# Patient Record
Sex: FEMALE | URBAN - METROPOLITAN AREA
[De-identification: names, ages, dates, MRNs, and addresses within clinical notes are randomized per-mention and may not be internally consistent; named-entity substitution may affect disease eponyms.]

---

## 2021-07-13 ENCOUNTER — OFFICE VISIT (OUTPATIENT)
Dept: URBAN - METROPOLITAN AREA TELEHEALTH 2 | Facility: TELEHEALTH | Age: 51
End: 2021-07-13
Payer: COMMERCIAL

## 2021-07-13 ENCOUNTER — LAB OUTSIDE AN ENCOUNTER (OUTPATIENT)
Dept: URBAN - METROPOLITAN AREA TELEHEALTH 2 | Facility: TELEHEALTH | Age: 51
End: 2021-07-13

## 2021-07-13 ENCOUNTER — TELEPHONE ENCOUNTER (OUTPATIENT)
Dept: URBAN - METROPOLITAN AREA CLINIC 92 | Facility: CLINIC | Age: 51
End: 2021-07-13

## 2021-07-13 DIAGNOSIS — K44.9 DIAPHRAGMATIC HERNIA WITHOUT OBSTRUCTION OR GANGRENE: ICD-10-CM

## 2021-07-13 DIAGNOSIS — K21.9 GASTROESOPHAGEAL REFLUX DISEASE WITHOUT ESOPHAGITIS: ICD-10-CM

## 2021-07-13 DIAGNOSIS — J98.2 ACQUIRED PNEUMOMEDIASTINUM: ICD-10-CM

## 2021-07-13 DIAGNOSIS — R11.2 NON-INTRACTABLE VOMITING WITH NAUSEA, UNSPECIFIED VOMITING TYPE: ICD-10-CM

## 2021-07-13 PROBLEM — 371132002: Status: ACTIVE | Noted: 2021-07-13

## 2021-07-13 PROCEDURE — 99203 OFFICE O/P NEW LOW 30 MIN: CPT | Performed by: INTERNAL MEDICINE

## 2021-07-13 RX ORDER — SUCRALFATE 1 G
1 TABLET PRIOR TO MEALS TABLET ORAL THREE TIMES A DAY
Qty: 90 TABLET | Refills: 1 | OUTPATIENT
Start: 2021-07-13 | End: 2021-09-11

## 2021-07-13 RX ORDER — BUSPIRONE HYDROCHLORIDE 10 MG/1
TABLET ORAL
Qty: 0 | Refills: 0 | Status: ACTIVE | COMMUNITY
Start: 1900-01-01

## 2021-07-13 RX ORDER — NALTREXONE HYDROCHLORIDE AND BUPROPION HYDROCHLORIDE 8; 90 MG/1; MG/1
2 TABLETS TABLET, EXTENDED RELEASE ORAL TWICE A DAY
Status: ACTIVE | COMMUNITY

## 2021-07-13 RX ORDER — BIOTIN 10 MG
1 TABLET TABLET ORAL ONCE A DAY
Status: ACTIVE | COMMUNITY

## 2021-07-13 RX ORDER — MULTIVIT-MIN/IRON/FOLIC ACID/K 18-600-40
AS DIRECTED CAPSULE ORAL
Status: ACTIVE | COMMUNITY

## 2021-07-13 RX ORDER — PANTOPRAZOLE SODIUM 40 MG/1
TAKE 1 TABLET BY MOUTH ONCE DAILY TABLET, DELAYED RELEASE ORAL
Qty: 90 | Refills: 0 | Status: ACTIVE | COMMUNITY
Start: 2018-06-29

## 2021-07-13 RX ORDER — OMEPRAZOLE 40 MG/1
1 CAPSULE 30 MINUTES BEFORE MORNING MEAL CAPSULE, DELAYED RELEASE ORAL ONCE A DAY
Status: ACTIVE | COMMUNITY

## 2021-07-13 RX ORDER — THIAMINE HCL 100 MG
AS DIRECTED TABLET ORAL
Status: ACTIVE | COMMUNITY

## 2021-07-13 RX ORDER — PANTOPRAZOLE SODIUM 20 MG/1
1 TABLET TABLET, DELAYED RELEASE ORAL TWICE A DAY
Qty: 180 TABLET | Refills: 0 | OUTPATIENT
Start: 2021-07-13

## 2021-07-13 NOTE — HPI-TODAY'S VISIT:
Ms. Mac Collins is a 50-year-old female was seen today over telehealth with complaints of having significant abdominal discomfort and pain in abnormal CT scan findings.  Patient was seen last in our office in 2018.  He reports history of having abdominoplasty as well as umbilical hernia repair in September 2020 at [facility] by Dr. Sharma.  Patient reports of postop complications of infection that required IV antibiotics and then oral clindamycin for extended period of time.  Patient stated ever since she had a surgery she has had abdominal bloating discomfort she still waits and wears the compression abdominal binder because of her swelling in her abdomen.  Patient stated she relocated to [place] for bed.  Around that time she said she continued to have some chest discomfort and pain and epigastric pain her surgeon have ordered a CT scan of the abdomen with CT scan was done at the [place] that showed a small pocket of pneumomediastinum are otherwise stress postsurgical changes were noted.  No evidence of cellulitis was noted.  Patient denies any prior renal prior history of having violent episode of throwing up after that led to the hospital or emergency room visit.  However she reports having significant heartburn on a daily basis and she vomits at least once a week.  Patient reports vomiting ingested material.  Patient denies any fever chills or rigors.  She has had an upper endoscopy in 2018 that showed 2 cm hiatal hernia.  She denies any chest pain at this time.  She reports her intermittent constipation and has been worse after the surgery.  Denies any fever chills or rigors denies melanotic stools.

## 2021-08-20 ENCOUNTER — TELEPHONE ENCOUNTER (OUTPATIENT)
Dept: URBAN - METROPOLITAN AREA CLINIC 115 | Facility: CLINIC | Age: 51
End: 2021-08-20

## 2021-08-24 PROBLEM — 16838000: Status: ACTIVE | Noted: 2021-07-13

## 2021-08-24 PROBLEM — 266435005: Status: ACTIVE | Noted: 2021-07-13

## 2021-08-31 ENCOUNTER — TELEPHONE ENCOUNTER (OUTPATIENT)
Dept: URBAN - METROPOLITAN AREA CLINIC 82 | Facility: CLINIC | Age: 51
End: 2021-08-31

## 2021-09-23 ENCOUNTER — TELEPHONE ENCOUNTER (OUTPATIENT)
Dept: URBAN - METROPOLITAN AREA CLINIC 92 | Facility: CLINIC | Age: 51
End: 2021-09-23

## 2021-09-23 ENCOUNTER — CLAIMS CREATED FROM THE CLAIM WINDOW (OUTPATIENT)
Dept: URBAN - METROPOLITAN AREA CLINIC 4 | Facility: CLINIC | Age: 51
End: 2021-09-23
Payer: COMMERCIAL

## 2021-09-23 ENCOUNTER — OFFICE VISIT (OUTPATIENT)
Dept: URBAN - METROPOLITAN AREA SURGERY CENTER 13 | Facility: SURGERY CENTER | Age: 51
End: 2021-09-23
Payer: COMMERCIAL

## 2021-09-23 DIAGNOSIS — R13.19 CERVICAL DYSPHAGIA: ICD-10-CM

## 2021-09-23 DIAGNOSIS — K21.9 ACID REFLUX: ICD-10-CM

## 2021-09-23 DIAGNOSIS — R11.2 ACUTE NAUSEA WITH NONBILIOUS VOMITING: ICD-10-CM

## 2021-09-23 DIAGNOSIS — K31.89 NONSURGICAL DUMPING SYNDROME: ICD-10-CM

## 2021-09-23 DIAGNOSIS — K31.89 ACQUIRED DEFORMITY OF DUODENUM: ICD-10-CM

## 2021-09-23 DIAGNOSIS — K21.9 GASTRO-ESOPHAGEAL REFLUX DISEASE WITHOUT ESOPHAGITIS: ICD-10-CM

## 2021-09-23 DIAGNOSIS — K22.2 ACQUIRED ESOPHAGEAL RING: ICD-10-CM

## 2021-09-23 PROCEDURE — 88305 TISSUE EXAM BY PATHOLOGIST: CPT | Performed by: PATHOLOGY

## 2021-09-23 PROCEDURE — 88312 SPECIAL STAINS GROUP 1: CPT | Performed by: PATHOLOGY

## 2021-09-23 PROCEDURE — G8907 PT DOC NO EVENTS ON DISCHARG: HCPCS | Performed by: INTERNAL MEDICINE

## 2021-09-23 PROCEDURE — 43239 EGD BIOPSY SINGLE/MULTIPLE: CPT | Performed by: INTERNAL MEDICINE

## 2021-09-23 PROCEDURE — 43249 ESOPH EGD DILATION <30 MM: CPT | Performed by: INTERNAL MEDICINE

## 2021-09-23 RX ORDER — THIAMINE HCL 100 MG
AS DIRECTED TABLET ORAL
Status: ACTIVE | COMMUNITY

## 2021-09-23 RX ORDER — BUSPIRONE HYDROCHLORIDE 10 MG/1
TABLET ORAL
Qty: 0 | Refills: 0 | Status: ACTIVE | COMMUNITY
Start: 1900-01-01

## 2021-09-23 RX ORDER — RIFAXIMIN 550 MG/1
1 TABLET TABLET ORAL THREE TIMES A DAY
Qty: 42 TABLET | Refills: 0 | OUTPATIENT
Start: 2021-09-23 | End: 2021-10-07

## 2021-09-23 RX ORDER — NALTREXONE HYDROCHLORIDE AND BUPROPION HYDROCHLORIDE 8; 90 MG/1; MG/1
2 TABLETS TABLET, EXTENDED RELEASE ORAL TWICE A DAY
Status: ACTIVE | COMMUNITY

## 2021-09-23 RX ORDER — MULTIVIT-MIN/IRON/FOLIC ACID/K 18-600-40
AS DIRECTED CAPSULE ORAL
Status: ACTIVE | COMMUNITY

## 2021-09-23 RX ORDER — BIOTIN 10 MG
1 TABLET TABLET ORAL ONCE A DAY
Status: ACTIVE | COMMUNITY

## 2021-09-23 RX ORDER — OMEPRAZOLE 40 MG/1
1 CAPSULE 30 MINUTES BEFORE MORNING MEAL CAPSULE, DELAYED RELEASE ORAL ONCE A DAY
Status: ACTIVE | COMMUNITY

## 2021-09-23 RX ORDER — RIFAXIMIN 550 MG/1
1 TABLET TABLET ORAL THREE TIMES A DAY
Qty: 42 TABLET | Refills: 0 | Status: ACTIVE | COMMUNITY
Start: 2021-09-23 | End: 2021-10-07

## 2021-09-23 RX ORDER — PANTOPRAZOLE SODIUM 40 MG/1
TAKE 1 TABLET BY MOUTH ONCE DAILY TABLET, DELAYED RELEASE ORAL
Qty: 90 TABLET | Refills: 1
Start: 2018-06-29

## 2021-09-23 RX ORDER — PANTOPRAZOLE SODIUM 40 MG/1
TAKE 1 TABLET BY MOUTH ONCE DAILY TABLET, DELAYED RELEASE ORAL
Qty: 90 | Refills: 0 | Status: ACTIVE | COMMUNITY
Start: 2018-06-29

## 2021-09-23 RX ORDER — PANTOPRAZOLE SODIUM 20 MG/1
1 TABLET TABLET, DELAYED RELEASE ORAL TWICE A DAY
Qty: 180 TABLET | Refills: 0 | Status: ACTIVE | COMMUNITY
Start: 2021-07-13

## 2021-09-23 RX ORDER — PANTOPRAZOLE SODIUM 40 MG/1
TAKE 1 TABLET BY MOUTH ONCE DAILY TABLET, DELAYED RELEASE ORAL
Qty: 90 TABLET | Refills: 1 | Status: ACTIVE | COMMUNITY
Start: 2018-06-29

## 2021-09-24 ENCOUNTER — TELEPHONE ENCOUNTER (OUTPATIENT)
Dept: URBAN - METROPOLITAN AREA TELEHEALTH 2 | Facility: TELEHEALTH | Age: 51
End: 2021-09-24

## 2021-09-26 ENCOUNTER — WEB ENCOUNTER (OUTPATIENT)
Dept: URBAN - METROPOLITAN AREA CLINIC 82 | Facility: CLINIC | Age: 51
End: 2021-09-26

## 2021-09-26 RX ORDER — AMOXICILLIN AND CLAVULANATE POTASSIUM 500; 125 MG/1; 1/1
1 TABLET TABLET, FILM COATED ORAL
Qty: 30 TABLET | Refills: 0 | OUTPATIENT
Start: 2021-09-27 | End: 2021-10-07

## 2021-10-05 ENCOUNTER — WEB ENCOUNTER (OUTPATIENT)
Dept: URBAN - METROPOLITAN AREA CLINIC 82 | Facility: CLINIC | Age: 51
End: 2021-10-05

## 2021-10-11 ENCOUNTER — WEB ENCOUNTER (OUTPATIENT)
Dept: URBAN - METROPOLITAN AREA CLINIC 82 | Facility: CLINIC | Age: 51
End: 2021-10-11

## 2021-12-29 ENCOUNTER — ERX REFILL RESPONSE (OUTPATIENT)
Dept: URBAN - METROPOLITAN AREA CLINIC 82 | Facility: CLINIC | Age: 51
End: 2021-12-29

## 2021-12-29 RX ORDER — PANTOPRAZOLE SODIUM 20 MG/1
1 TABLET TABLET, DELAYED RELEASE ORAL TWICE A DAY
Qty: 180 TABLET | Refills: 0 | OUTPATIENT

## 2021-12-29 RX ORDER — PANTOPRAZOLE 20 MG/1
TAKE 1 TABLET BY MOUTH TWICE DAILY TABLET, DELAYED RELEASE ORAL
Qty: 180 TABLET | Refills: 1 | OUTPATIENT

## 2022-04-13 ENCOUNTER — TELEPHONE ENCOUNTER (OUTPATIENT)
Dept: URBAN - METROPOLITAN AREA CLINIC 115 | Facility: CLINIC | Age: 52
End: 2022-04-13

## 2022-04-13 RX ORDER — RIFAXIMIN 550 MG/1
1 TABLET TABLET ORAL THREE TIMES A DAY
Qty: 42 TABLET | Refills: 0 | OUTPATIENT
Start: 2022-04-13 | End: 2022-04-27

## 2022-12-27 ENCOUNTER — TELEPHONE ENCOUNTER (OUTPATIENT)
Dept: URBAN - METROPOLITAN AREA CLINIC 115 | Facility: CLINIC | Age: 52
End: 2022-12-27

## 2022-12-27 RX ORDER — RIFAXIMIN 550 MG/1
1 TABLET TABLET ORAL THREE TIMES A DAY
Qty: 42 TABLET | Refills: 0 | OUTPATIENT
Start: 2022-12-28 | End: 2023-01-11

## 2023-02-09 ENCOUNTER — OFFICE VISIT (OUTPATIENT)
Dept: URBAN - METROPOLITAN AREA CLINIC 82 | Facility: CLINIC | Age: 53
End: 2023-02-09

## 2023-05-10 ENCOUNTER — OFFICE VISIT (OUTPATIENT)
Dept: URBAN - METROPOLITAN AREA CLINIC 115 | Facility: CLINIC | Age: 53
End: 2023-05-10
Payer: COMMERCIAL

## 2023-05-10 ENCOUNTER — LAB OUTSIDE AN ENCOUNTER (OUTPATIENT)
Dept: URBAN - METROPOLITAN AREA CLINIC 115 | Facility: CLINIC | Age: 53
End: 2023-05-10

## 2023-05-10 VITALS
BODY MASS INDEX: 28.93 KG/M2 | TEMPERATURE: 97.2 F | SYSTOLIC BLOOD PRESSURE: 114 MMHG | HEIGHT: 66 IN | HEART RATE: 57 BPM | WEIGHT: 180 LBS | DIASTOLIC BLOOD PRESSURE: 72 MMHG

## 2023-05-10 DIAGNOSIS — K21.9 GASTRO-ESOPHAGEAL REFLUX DISEASE WITHOUT ESOPHAGITIS: ICD-10-CM

## 2023-05-10 DIAGNOSIS — K44.9 DIAPHRAGMATIC HERNIA WITHOUT OBSTRUCTION OR GANGRENE: ICD-10-CM

## 2023-05-10 DIAGNOSIS — K58.0 IRRITABLE BOWEL SYNDROME WITH DIARRHEA: ICD-10-CM

## 2023-05-10 DIAGNOSIS — R10.33 PERIUMBILICAL ABDOMINAL PAIN: ICD-10-CM

## 2023-05-10 PROBLEM — 443503005: Status: ACTIVE | Noted: 2023-05-10

## 2023-05-10 PROBLEM — 197125005: Status: ACTIVE | Noted: 2023-05-10

## 2023-05-10 PROBLEM — 62315008: Status: ACTIVE | Noted: 2023-05-10

## 2023-05-10 PROBLEM — 39839004: Status: ACTIVE | Noted: 2023-05-10

## 2023-05-10 PROCEDURE — 99214 OFFICE O/P EST MOD 30 MIN: CPT | Performed by: INTERNAL MEDICINE

## 2023-05-10 RX ORDER — BUSPIRONE HYDROCHLORIDE 10 MG/1
TABLET ORAL
Qty: 0 | Refills: 0 | Status: DISCONTINUED | COMMUNITY
Start: 1900-01-01

## 2023-05-10 RX ORDER — HYOSCYAMINE SULFATE EXTENDED-RELEASE 0.38 MG/1
1 TABLET TABLET ORAL
Qty: 60 | Refills: 3 | OUTPATIENT
Start: 2023-05-10 | End: 2023-09-07

## 2023-05-10 RX ORDER — NALTREXONE HYDROCHLORIDE AND BUPROPION HYDROCHLORIDE 8; 90 MG/1; MG/1
2 TABLETS TABLET, EXTENDED RELEASE ORAL TWICE A DAY
Status: DISCONTINUED | COMMUNITY

## 2023-05-10 RX ORDER — VIBEGRON 75 MG/1
1 TABLET TABLET, FILM COATED ORAL ONCE A DAY
Status: ACTIVE | COMMUNITY

## 2023-05-10 RX ORDER — OMEPRAZOLE 40 MG/1
1 CAPSULE 30 MINUTES BEFORE MORNING MEAL CAPSULE, DELAYED RELEASE ORAL ONCE A DAY
Status: DISCONTINUED | COMMUNITY

## 2023-05-10 RX ORDER — THIAMINE HCL 100 MG
AS DIRECTED TABLET ORAL
Status: ACTIVE | COMMUNITY

## 2023-05-10 RX ORDER — PANTOPRAZOLE 20 MG/1
TAKE 1 TABLET BY MOUTH TWICE DAILY TABLET, DELAYED RELEASE ORAL
Qty: 180 TABLET | Refills: 1 | Status: DISCONTINUED | COMMUNITY

## 2023-05-10 RX ORDER — BIOTIN 10 MG
1 TABLET TABLET ORAL ONCE A DAY
Status: ACTIVE | COMMUNITY

## 2023-05-10 RX ORDER — MULTIVIT-MIN/IRON/FOLIC ACID/K 18-600-40
AS DIRECTED CAPSULE ORAL
Status: ACTIVE | COMMUNITY

## 2023-05-10 RX ORDER — POLYETHYLENE GLYCOL 3350 17 G/DOSE
AS DIRECTED PRIOR TO COLONSOCOPY POWDER (GRAM) ORAL ONCE A DAY
Qty: 238  GRAM | Refills: 0 | OUTPATIENT
Start: 2023-05-10 | End: 2023-05-11

## 2023-05-10 RX ORDER — PANTOPRAZOLE SODIUM 40 MG/1
TAKE 1 TABLET BY MOUTH ONCE DAILY TABLET, DELAYED RELEASE ORAL
Qty: 90 TABLET | Refills: 1 | Status: DISCONTINUED | COMMUNITY
Start: 2018-06-29

## 2023-05-10 NOTE — HPI-TODAY'S VISIT:
Ms. Watts was seen today for complaints of having episodes of abdominal pain bloating cramping as well as diarrhea that is been going on for 4-6 times per day.  Patient reports having multiple bathroom trips at work.  She had been on Mounjaro for weight loss and took it for 2 months.  Patient stated she could not tolerated because it caused her severe constipation.  Even though she lost weight she could not continue with the maintenance because of the severe GI intolerance.  Patient reports losing about 30 pounds.  Complains of having abdominal bloating distention epigastric discomfort and pain.  In the past she was prescribed Xifaxan it did not help her with her complaints of having bloating and diarrhea.  She reports having recent blood testing for the labs which were told to be unremarkable.  Also complains of having overreactive bladder urinary bladder with urinary symptoms.  Recent upper endoscopy findings were reviewed that showed Schatzki's ring esophagitis and a 3 cm hiatal hernia.  Patient reports occasional dysphagia especially when she eats faster denies any vomiting.

## 2023-05-11 ENCOUNTER — OFFICE VISIT (OUTPATIENT)
Dept: URBAN - METROPOLITAN AREA CLINIC 114 | Facility: CLINIC | Age: 53
End: 2023-05-11
Payer: COMMERCIAL

## 2023-05-11 DIAGNOSIS — R10.33 PERIUMBILICAL ABDOMINAL PAIN: ICD-10-CM

## 2023-05-11 LAB
ALMOND (F20) IGE: <0.1
C-REACTIVE PROTEIN, QUANT: 6.6
CASHEW NUT (F202) IGE: <0.1
CLASS: (no result)
CLASS: 0
CODFISH (F3) IGE: <0.1
COW'S MILK (F2) IGE: 0.28
EGG WHITE (F1) IGE: <0.1
HAZELNUT (F17) IGE: <0.1
INTERPRETATION: (no result)
PEANUT (F13) IGE: <0.1
SALMON (F41) IGE: <0.1
SCALLOP (F338) IGE: <0.1
SESAME SEED (F10) IGE: <0.1
SHRIMP (F24) IGE: <0.1
SOYBEAN (F14) IGE: <0.1
TUNA (F40) IGE: <0.1
WALNUT (F256) IGE: <0.1
WHEAT (F4) IGE: <0.1

## 2023-05-11 PROCEDURE — 76700 US EXAM ABDOM COMPLETE: CPT | Performed by: INTERNAL MEDICINE

## 2023-05-11 RX ORDER — POLYETHYLENE GLYCOL 3350 17 G/DOSE
AS DIRECTED PRIOR TO COLONSOCOPY POWDER (GRAM) ORAL ONCE A DAY
Qty: 238  GRAM | Refills: 0 | Status: ACTIVE | COMMUNITY
Start: 2023-05-10 | End: 2023-05-11

## 2023-05-11 RX ORDER — VIBEGRON 75 MG/1
1 TABLET TABLET, FILM COATED ORAL ONCE A DAY
Status: ACTIVE | COMMUNITY

## 2023-05-11 RX ORDER — HYOSCYAMINE SULFATE EXTENDED-RELEASE 0.38 MG/1
1 TABLET TABLET ORAL
Qty: 60 | Refills: 3 | Status: ACTIVE | COMMUNITY
Start: 2023-05-10 | End: 2023-09-07

## 2023-05-11 RX ORDER — MULTIVIT-MIN/IRON/FOLIC ACID/K 18-600-40
AS DIRECTED CAPSULE ORAL
Status: ACTIVE | COMMUNITY

## 2023-05-11 RX ORDER — BIOTIN 10 MG
1 TABLET TABLET ORAL ONCE A DAY
Status: ACTIVE | COMMUNITY

## 2023-05-11 RX ORDER — THIAMINE HCL 100 MG
AS DIRECTED TABLET ORAL
Status: ACTIVE | COMMUNITY

## 2023-05-18 ENCOUNTER — OFFICE VISIT (OUTPATIENT)
Dept: URBAN - METROPOLITAN AREA CLINIC 82 | Facility: CLINIC | Age: 53
End: 2023-05-18

## 2023-05-21 LAB
CALPROTECTIN, FECAL: 43
CLOSTRIDIUM DIFFICILE TOXINB,QL REAL TIME PCR: NOT DETECTED
FECAL FAT, QUALITATIVE: NORMAL

## 2023-05-22 LAB — TRICHROME (1): (no result)

## 2023-06-02 ENCOUNTER — WEB ENCOUNTER (OUTPATIENT)
Dept: URBAN - METROPOLITAN AREA CLINIC 115 | Facility: CLINIC | Age: 53
End: 2023-06-02

## 2023-06-08 ENCOUNTER — WEB ENCOUNTER (OUTPATIENT)
Dept: URBAN - METROPOLITAN AREA SURGERY CENTER 13 | Facility: SURGERY CENTER | Age: 53
End: 2023-06-08

## 2023-06-12 ENCOUNTER — CLAIMS CREATED FROM THE CLAIM WINDOW (OUTPATIENT)
Dept: URBAN - METROPOLITAN AREA CLINIC 4 | Facility: CLINIC | Age: 53
End: 2023-06-12
Payer: COMMERCIAL

## 2023-06-12 ENCOUNTER — OFFICE VISIT (OUTPATIENT)
Dept: URBAN - METROPOLITAN AREA SURGERY CENTER 13 | Facility: SURGERY CENTER | Age: 53
End: 2023-06-12
Payer: COMMERCIAL

## 2023-06-12 DIAGNOSIS — K31.89 OTHER DISEASES OF STOMACH AND DUODENUM: ICD-10-CM

## 2023-06-12 DIAGNOSIS — R19.7 DIARRHEA, UNSPECIFIED TYPE: ICD-10-CM

## 2023-06-12 DIAGNOSIS — K22.89 OTHER SPECIFIED DISEASE OF ESOPHAGUS: ICD-10-CM

## 2023-06-12 DIAGNOSIS — K29.70 GASTRITIS, UNSPECIFIED, WITHOUT BLEEDING: ICD-10-CM

## 2023-06-12 DIAGNOSIS — K52.832 LYMPHOCYTIC  COLITIS: ICD-10-CM

## 2023-06-12 DIAGNOSIS — K30 DYSPEPSIA: ICD-10-CM

## 2023-06-12 DIAGNOSIS — K52.839 MICROSCOPIC COLITIS, UNSPECIFIED: ICD-10-CM

## 2023-06-12 DIAGNOSIS — K52.832 LYMPHOCYTIC COLITIS: ICD-10-CM

## 2023-06-12 PROCEDURE — 88342 IMHCHEM/IMCYTCHM 1ST ANTB: CPT | Performed by: PATHOLOGY

## 2023-06-12 PROCEDURE — 88313 SPECIAL STAINS GROUP 2: CPT | Performed by: PATHOLOGY

## 2023-06-12 PROCEDURE — 88312 SPECIAL STAINS GROUP 1: CPT | Performed by: PATHOLOGY

## 2023-06-12 PROCEDURE — 88305 TISSUE EXAM BY PATHOLOGIST: CPT | Performed by: PATHOLOGY

## 2023-06-12 PROCEDURE — G8907 PT DOC NO EVENTS ON DISCHARG: HCPCS | Performed by: INTERNAL MEDICINE

## 2023-06-12 PROCEDURE — 45380 COLONOSCOPY AND BIOPSY: CPT | Performed by: INTERNAL MEDICINE

## 2023-06-12 PROCEDURE — 43239 EGD BIOPSY SINGLE/MULTIPLE: CPT | Performed by: INTERNAL MEDICINE

## 2023-06-13 ENCOUNTER — TELEPHONE ENCOUNTER (OUTPATIENT)
Dept: URBAN - NONMETROPOLITAN AREA CLINIC 2 | Facility: CLINIC | Age: 53
End: 2023-06-13

## 2023-06-13 RX ORDER — OMEPRAZOLE 20 MG/1
1 CAPSULE 30 MINUTES BEFORE MORNING MEAL CAPSULE, DELAYED RELEASE ORAL ONCE A DAY
Qty: 90 CAPSULE | Refills: 3 | OUTPATIENT
Start: 2023-06-14

## 2023-06-19 ENCOUNTER — TELEPHONE ENCOUNTER (OUTPATIENT)
Dept: URBAN - METROPOLITAN AREA CLINIC 82 | Facility: CLINIC | Age: 53
End: 2023-06-19

## 2023-06-19 PROBLEM — 1187071008: Status: ACTIVE | Noted: 2023-06-19

## 2023-06-19 RX ORDER — BIOTIN 10 MG
1 TABLET TABLET ORAL ONCE A DAY
Status: ACTIVE | COMMUNITY

## 2023-06-19 RX ORDER — OMEPRAZOLE 20 MG/1
1 CAPSULE 30 MINUTES BEFORE MORNING MEAL CAPSULE, DELAYED RELEASE ORAL ONCE A DAY
Qty: 90 CAPSULE | Refills: 3 | Status: ACTIVE | COMMUNITY
Start: 2023-06-14

## 2023-06-19 RX ORDER — MULTIVIT-MIN/IRON/FOLIC ACID/K 18-600-40
AS DIRECTED CAPSULE ORAL
Status: ACTIVE | COMMUNITY

## 2023-06-19 RX ORDER — HYOSCYAMINE SULFATE EXTENDED-RELEASE 0.38 MG/1
1 TABLET TABLET ORAL
Qty: 60 | Refills: 3 | Status: ACTIVE | COMMUNITY
Start: 2023-05-10 | End: 2023-09-07

## 2023-06-19 RX ORDER — BUDESONIDE 3 MG/1
3 CAPSULES ONCE A DAY FOR 1 MONTH AND THEN 2 CAPS ONCE A DAY FOR 2 WEEKS, 1 CAP ONCE A DAY FOR 2 WEEKS CAPSULE, DELAYED RELEASE PELLETS ORAL ONCE A DAY
Qty: 180 CAPSULE | Refills: 0 | OUTPATIENT
Start: 2023-06-19

## 2023-06-19 RX ORDER — THIAMINE HCL 100 MG
AS DIRECTED TABLET ORAL
Status: ACTIVE | COMMUNITY

## 2023-06-19 RX ORDER — VIBEGRON 75 MG/1
1 TABLET TABLET, FILM COATED ORAL ONCE A DAY
Status: ACTIVE | COMMUNITY

## 2023-07-05 ENCOUNTER — WEB ENCOUNTER (OUTPATIENT)
Dept: URBAN - METROPOLITAN AREA CLINIC 115 | Facility: CLINIC | Age: 53
End: 2023-07-05

## 2023-08-14 ENCOUNTER — OFFICE VISIT (OUTPATIENT)
Dept: URBAN - METROPOLITAN AREA CLINIC 82 | Facility: CLINIC | Age: 53
End: 2023-08-14
Payer: COMMERCIAL

## 2023-08-14 VITALS
TEMPERATURE: 97.4 F | DIASTOLIC BLOOD PRESSURE: 70 MMHG | HEART RATE: 78 BPM | SYSTOLIC BLOOD PRESSURE: 115 MMHG | BODY MASS INDEX: 30.53 KG/M2 | HEIGHT: 66 IN | WEIGHT: 190 LBS

## 2023-08-14 DIAGNOSIS — K58.0 IRRITABLE BOWEL SYNDROME WITH DIARRHEA: ICD-10-CM

## 2023-08-14 DIAGNOSIS — K29.90 GASTRITIS AND DUODENITIS: ICD-10-CM

## 2023-08-14 DIAGNOSIS — K52.832 LYMPHOCYTIC COLITIS: ICD-10-CM

## 2023-08-14 DIAGNOSIS — K21.9 GASTROESOPHAGEAL REFLUX DISEASE WITHOUT ESOPHAGITIS: ICD-10-CM

## 2023-08-14 PROBLEM — 196731005: Status: ACTIVE | Noted: 2023-08-14

## 2023-08-14 PROCEDURE — 99214 OFFICE O/P EST MOD 30 MIN: CPT | Performed by: INTERNAL MEDICINE

## 2023-08-14 RX ORDER — BUDESONIDE 3 MG/1
3 CAPSULES ONCE A DAY FOR 1 MONTH AND THEN 2 CAPS ONCE A DAY FOR 2 WEEKS, 1 CAP ONCE A DAY FOR 2 WEEKS CAPSULE, DELAYED RELEASE PELLETS ORAL ONCE A DAY
Qty: 180 CAPSULE | Refills: 0 | Status: ACTIVE | COMMUNITY
Start: 2023-06-19

## 2023-08-14 RX ORDER — BIOTIN 10 MG
1 TABLET TABLET ORAL ONCE A DAY
Status: DISCONTINUED | COMMUNITY

## 2023-08-14 RX ORDER — OMEPRAZOLE 20 MG/1
1 CAPSULE 30 MINUTES BEFORE MORNING MEAL CAPSULE, DELAYED RELEASE ORAL ONCE A DAY
Qty: 90 CAPSULE | Refills: 3 | Status: ACTIVE | COMMUNITY
Start: 2023-06-14

## 2023-08-14 RX ORDER — MULTIVIT-MIN/IRON/FOLIC ACID/K 18-600-40
AS DIRECTED CAPSULE ORAL
Status: DISCONTINUED | COMMUNITY

## 2023-08-14 RX ORDER — HYOSCYAMINE SULFATE EXTENDED-RELEASE 0.38 MG/1
1 TABLET TABLET ORAL
Qty: 60 | Refills: 3 | Status: DISCONTINUED | COMMUNITY
Start: 2023-05-10 | End: 2023-09-07

## 2023-08-14 RX ORDER — AMITRIPTYLINE HYDROCHLORIDE 25 MG/1
1 TABLET AT BEDTIME TABLET, FILM COATED ORAL ONCE A DAY
Qty: 30 TABLET | Refills: 3 | OUTPATIENT
Start: 2023-08-14

## 2023-08-14 RX ORDER — THIAMINE HCL 100 MG
AS DIRECTED TABLET ORAL
Status: DISCONTINUED | COMMUNITY

## 2023-08-14 RX ORDER — BUDESONIDE 3 MG/1
2 CAPSULES CAPSULE, DELAYED RELEASE PELLETS ORAL ONCE A DAY
Qty: 60 CAPSULE | Refills: 1 | OUTPATIENT

## 2023-08-14 RX ORDER — VIBEGRON 75 MG/1
1 TABLET TABLET, FILM COATED ORAL ONCE A DAY
Status: DISCONTINUED | COMMUNITY

## 2023-08-14 RX ORDER — SUCRALFATE 1 G
1 TABLET PRIOR TO MEALS TABLET ORAL TWICE A DAY
Qty: 60 TABLET | Refills: 1 | OUTPATIENT
Start: 2023-08-14 | End: 2023-10-13

## 2023-08-14 RX ORDER — HYOSCYAMINE SULFATE 0.12 MG/1
1 TABLET UNDER THE TONGUE AND ALLOW TO DISSOLVE  AS NEEDED FOR CRAMPING PAIN TABLET, ORALLY DISINTEGRATING ORAL THREE TIMES A DAY
Qty: 60 TABLET | Refills: 2 | OUTPATIENT
Start: 2023-08-14 | End: 2023-11-11

## 2023-08-17 LAB
A/G RATIO: 1.3
ALBUMIN: 4.1
ALKALINE PHOSPHATASE: 119
ALT (SGPT): 16
AST (SGOT): 15
BILIRUBIN, TOTAL: 0.2
BUN/CREATININE RATIO: (no result)
BUN: 20
CALCIUM: 9.2
CARBON DIOXIDE, TOTAL: 27
CHLORIDE: 105
CREATININE: 0.86
EGFR: 81
GLOBULIN, TOTAL: 3.2
GLUCOSE: 86
HEMATOCRIT: 35.9
HEMOGLOBIN: 11.7
IMMUNOGLOBULIN A: 419
INTERPRETATION: (no result)
MCH: 29.2
MCHC: 32.6
MCV: 89.5
MPV: 10.4
PLATELET COUNT: 252
POTASSIUM: 4.3
PROTEIN, TOTAL: 7.3
RDW: 12
RED BLOOD CELL COUNT: 4.01
SODIUM: 140
TISSUE TRANSGLUTAMINASE AB, IGA: <1
WHITE BLOOD CELL COUNT: 8.6

## 2023-08-21 ENCOUNTER — TELEPHONE ENCOUNTER (OUTPATIENT)
Dept: URBAN - METROPOLITAN AREA CLINIC 82 | Facility: CLINIC | Age: 53
End: 2023-08-21

## 2023-10-02 ENCOUNTER — OFFICE VISIT (OUTPATIENT)
Dept: URBAN - METROPOLITAN AREA CLINIC 82 | Facility: CLINIC | Age: 53
End: 2023-10-02
Payer: COMMERCIAL

## 2023-10-02 VITALS
SYSTOLIC BLOOD PRESSURE: 130 MMHG | BODY MASS INDEX: 32.3 KG/M2 | DIASTOLIC BLOOD PRESSURE: 84 MMHG | HEART RATE: 103 BPM | WEIGHT: 201 LBS | TEMPERATURE: 97.4 F | HEIGHT: 66 IN

## 2023-10-02 DIAGNOSIS — K52.832 LYMPHOCYTIC COLITIS: ICD-10-CM

## 2023-10-02 DIAGNOSIS — K29.90 GASTRITIS AND DUODENITIS: ICD-10-CM

## 2023-10-02 DIAGNOSIS — K58.0 IRRITABLE BOWEL SYNDROME WITH DIARRHEA: ICD-10-CM

## 2023-10-02 DIAGNOSIS — K21.9 GASTROESOPHAGEAL REFLUX DISEASE WITHOUT ESOPHAGITIS: ICD-10-CM

## 2023-10-02 PROCEDURE — 99214 OFFICE O/P EST MOD 30 MIN: CPT | Performed by: INTERNAL MEDICINE

## 2023-10-02 RX ORDER — BUDESONIDE 3 MG/1
2 CAPSULES CAPSULE, DELAYED RELEASE PELLETS ORAL ONCE A DAY
Qty: 60 CAPSULE | Refills: 1 | Status: ACTIVE | COMMUNITY

## 2023-10-02 RX ORDER — AMITRIPTYLINE HYDROCHLORIDE 25 MG/1
1 TABLET AT BEDTIME TABLET, FILM COATED ORAL ONCE A DAY
Qty: 30 TABLET | Refills: 3 | OUTPATIENT

## 2023-10-02 RX ORDER — OMEPRAZOLE 40 MG/1
1 CAPSULE 30 MINUTES BEFORE MORNING MEAL CAPSULE, DELAYED RELEASE ORAL ONCE A DAY
Qty: 30 CAPSULE | Refills: 5 | OUTPATIENT
Start: 2023-10-02

## 2023-10-02 RX ORDER — SUCRALFATE 1 G
1 TABLET PRIOR TO MEALS TABLET ORAL TWICE A DAY
Qty: 60 TABLET | Refills: 1 | OUTPATIENT
Start: 2023-10-02 | End: 2023-11-30

## 2023-10-02 RX ORDER — AMITRIPTYLINE HYDROCHLORIDE 25 MG/1
1 TABLET AT BEDTIME TABLET, FILM COATED ORAL ONCE A DAY
Qty: 30 TABLET | Refills: 3 | Status: ACTIVE | COMMUNITY
Start: 2023-08-14

## 2023-10-02 RX ORDER — HYOSCYAMINE SULFATE 0.12 MG/1
1 TABLET UNDER THE TONGUE AND ALLOW TO DISSOLVE  AS NEEDED FOR CRAMPING PAIN TABLET, ORALLY DISINTEGRATING ORAL THREE TIMES A DAY
Qty: 60 TABLET | Refills: 2 | Status: ACTIVE | COMMUNITY
Start: 2023-08-14 | End: 2023-11-11

## 2023-10-02 RX ORDER — SUCRALFATE 1 G
1 TABLET PRIOR TO MEALS TABLET ORAL TWICE A DAY
Qty: 60 TABLET | Refills: 1 | Status: ACTIVE | COMMUNITY
Start: 2023-08-14 | End: 2023-10-13

## 2023-10-02 RX ORDER — OMEPRAZOLE 20 MG/1
1 CAPSULE 30 MINUTES BEFORE MORNING MEAL CAPSULE, DELAYED RELEASE ORAL ONCE A DAY
Qty: 90 CAPSULE | Refills: 3 | Status: ACTIVE | COMMUNITY
Start: 2023-06-14

## 2023-10-02 NOTE — HPI-TODAY'S VISIT:
Ms. Watts was seen today for complaints of having episodes of abdominal pain bloating cramping as well as diarrhea that is been going on for 4-6 times per day.  Patient reports having multiple bathroom trips at work.  She had been on Mounjaro for weight loss and took it for 2 months.  Patient stated she could not tolerated because it caused her severe constipation.  Even though she lost weight she could not continue with the maintenance because of the severe GI intolerance.  Patient reports losing about 30 pounds.  Complains of having abdominal bloating distention epigastric discomfort and pain.  In the past she was prescribed Xifaxan it did not help her with her complaints of having bloating and diarrhea.  She reports having recent blood testing for the labs which were told to be unremarkable.  Also complains of having overreactive bladder urinary bladder with urinary symptoms.  Recent upper endoscopy findings were reviewed that showed Schatzki's ring esophagitis and a 3 cm hiatal hernia.  Patient reports occasional dysphagia especially when she eats faster denies any vomiting.  8/14/23: Ms. Wong was seen today for follow-up for lymphocytic colitis.  Patient was recently diagnosed with lymphocytic colitis as well as gastritis based upon the random colon biopsies.  Patient denies any recent any NSAIDs on a frequent basis.  Patient had been on Mounjaro on Ozempic prior to starting the symptoms.  Also was noted to have acute gastritis.  Patient stated she is currently not taking any NSAIDs and budesonide has changed her bowel movements to 2 to 3/day but still has some abdominal cramping and discomfort.  Patient stated pain and cramping intermittent diarrhea gets worse when she is stressed and mostly at work.  Denies any fresh blood per rectum or melanotic stools.  Her son has some inflammatory bowel disease.  10/2/23 : Patient was seen today for follow-up for lymphocytic colitis.  Patient reports a marked improvement of her colitis since she had been on budesonide.  Patient also stated her complaints of abdominal pain and cramping as well as stress and anxiety also nearly completely improved since she had been taking amitriptyline 25 mg.  Patient reports cutting down the dose to 12.5 most of the time and she occasionally takes a total of 25 mg stools.  Patient denies any chest pain shortness of breath.  Patient reports that her GI symptoms have been stable. Diarrhea has resolved furosemide.  Denies using any other NSAIDs.

## 2023-10-05 ENCOUNTER — WEB ENCOUNTER (OUTPATIENT)
Dept: URBAN - METROPOLITAN AREA CLINIC 82 | Facility: CLINIC | Age: 53
End: 2023-10-05

## 2023-10-06 ENCOUNTER — TELEPHONE ENCOUNTER (OUTPATIENT)
Dept: URBAN - NONMETROPOLITAN AREA CLINIC 2 | Facility: CLINIC | Age: 53
End: 2023-10-06

## 2023-10-19 ENCOUNTER — OFFICE VISIT (OUTPATIENT)
Dept: URBAN - METROPOLITAN AREA CLINIC 82 | Facility: CLINIC | Age: 53
End: 2023-10-19
Payer: COMMERCIAL

## 2023-10-19 VITALS
WEIGHT: 199 LBS | HEIGHT: 66 IN | BODY MASS INDEX: 31.98 KG/M2 | SYSTOLIC BLOOD PRESSURE: 113 MMHG | HEART RATE: 87 BPM | TEMPERATURE: 97.5 F | DIASTOLIC BLOOD PRESSURE: 78 MMHG

## 2023-10-19 DIAGNOSIS — K29.90 GASTRITIS AND DUODENITIS: ICD-10-CM

## 2023-10-19 DIAGNOSIS — K58.0 IRRITABLE BOWEL SYNDROME WITH DIARRHEA: ICD-10-CM

## 2023-10-19 DIAGNOSIS — K52.832 LYMPHOCYTIC COLITIS: ICD-10-CM

## 2023-10-19 DIAGNOSIS — K21.9 GASTROESOPHAGEAL REFLUX DISEASE WITHOUT ESOPHAGITIS: ICD-10-CM

## 2023-10-19 PROCEDURE — 99214 OFFICE O/P EST MOD 30 MIN: CPT | Performed by: INTERNAL MEDICINE

## 2023-10-19 RX ORDER — OMEPRAZOLE 40 MG/1
1 CAPSULE 30 MINUTES BEFORE MORNING MEAL CAPSULE, DELAYED RELEASE ORAL ONCE A DAY
Qty: 30 CAPSULE | Refills: 5 | Status: ACTIVE | COMMUNITY
Start: 2023-10-02

## 2023-10-19 RX ORDER — SUCRALFATE 1 G
1 TABLET PRIOR TO MEALS TABLET ORAL TWICE A DAY
Qty: 60 TABLET | Refills: 1 | Status: ACTIVE | COMMUNITY
Start: 2023-10-02 | End: 2023-11-30

## 2023-10-19 RX ORDER — OMEPRAZOLE 20 MG/1
1 CAPSULE 30 MINUTES BEFORE MORNING MEAL CAPSULE, DELAYED RELEASE ORAL ONCE A DAY
Qty: 90 CAPSULE | Refills: 3 | Status: ON HOLD | COMMUNITY
Start: 2023-06-14

## 2023-10-19 RX ORDER — BUDESONIDE 3 MG/1
2 CAPSULES CAPSULE, DELAYED RELEASE PELLETS ORAL ONCE A DAY
Qty: 60 CAPSULE | Refills: 1

## 2023-10-19 RX ORDER — AMITRIPTYLINE HYDROCHLORIDE 25 MG/1
1 TABLET AT BEDTIME TABLET, FILM COATED ORAL ONCE A DAY
Qty: 30 TABLET | Refills: 3 | OUTPATIENT

## 2023-10-19 RX ORDER — HYOSCYAMINE SULFATE 0.12 MG/1
1 TABLET UNDER THE TONGUE AND ALLOW TO DISSOLVE  AS NEEDED FOR CRAMPING PAIN TABLET, ORALLY DISINTEGRATING ORAL THREE TIMES A DAY
Qty: 60 TABLET | Refills: 2 | Status: ON HOLD | COMMUNITY
Start: 2023-08-14 | End: 2023-11-11

## 2023-10-19 RX ORDER — AMITRIPTYLINE HYDROCHLORIDE 25 MG/1
1 TABLET AT BEDTIME TABLET, FILM COATED ORAL ONCE A DAY
Qty: 30 TABLET | Refills: 3 | Status: ACTIVE | COMMUNITY

## 2023-10-19 RX ORDER — OMEPRAZOLE 40 MG/1
1 CAPSULE 30 MINUTES BEFORE MORNING MEAL CAPSULE, DELAYED RELEASE ORAL ONCE A DAY
Qty: 30 CAPSULE | Refills: 5 | OUTPATIENT

## 2023-10-19 RX ORDER — SUCRALFATE 1 G
1 TABLET PRIOR TO MEALS TABLET ORAL TWICE A DAY
Qty: 60 TABLET | Refills: 1 | OUTPATIENT

## 2023-10-19 RX ORDER — BUDESONIDE 3 MG/1
2 CAPSULES CAPSULE, DELAYED RELEASE PELLETS ORAL ONCE A DAY
Qty: 60 CAPSULE | Refills: 1 | Status: ACTIVE | COMMUNITY

## 2023-10-19 RX ORDER — DICYCLOMINE HYDROCHLORIDE 10 MG/1
1 CAPSULES CAPSULE ORAL
Qty: 90 CAPSULE | Refills: 3 | OUTPATIENT
Start: 2023-10-19 | End: 2024-02-15

## 2023-10-19 NOTE — HPI-TODAY'S VISIT:
Patient was seen today for follow-up.  She went to the ER about 2 weeks ago with a complaints of having severe abdominal pain and blood in the stools.  Patient stated she woke up in the middle of the night with the diarrhea abdominal pain and cramping and the pain did not improve and hence she went to the ER.  CT scan of the abdomen pelvis showed some decompressed descending colon versus colitis and she was treated with Cipro and Flagyl.  Currently she is on 1 tablet of budesonide for collagenous colitis.  Patient stated she continues to have loose stools in the past week since she has been on antibiotics.  Denies any mucus in the stools.  Denies any unintentional weight loss.  She reports having increased stress which increases the symptoms of diarrhea.

## 2023-11-30 ENCOUNTER — OFFICE VISIT (OUTPATIENT)
Dept: URBAN - METROPOLITAN AREA CLINIC 82 | Facility: CLINIC | Age: 53
End: 2023-11-30
Payer: COMMERCIAL

## 2023-11-30 ENCOUNTER — DASHBOARD ENCOUNTERS (OUTPATIENT)
Age: 53
End: 2023-11-30

## 2023-11-30 VITALS
DIASTOLIC BLOOD PRESSURE: 77 MMHG | TEMPERATURE: 97.9 F | HEART RATE: 90 BPM | BODY MASS INDEX: 32.14 KG/M2 | WEIGHT: 200 LBS | HEIGHT: 66 IN | SYSTOLIC BLOOD PRESSURE: 113 MMHG

## 2023-11-30 DIAGNOSIS — K29.90 GASTRITIS AND DUODENITIS: ICD-10-CM

## 2023-11-30 DIAGNOSIS — K52.832 LYMPHOCYTIC COLITIS: ICD-10-CM

## 2023-11-30 DIAGNOSIS — K21.9 GASTROESOPHAGEAL REFLUX DISEASE WITHOUT ESOPHAGITIS: ICD-10-CM

## 2023-11-30 DIAGNOSIS — K58.0 IRRITABLE BOWEL SYNDROME WITH DIARRHEA: ICD-10-CM

## 2023-11-30 PROCEDURE — 99214 OFFICE O/P EST MOD 30 MIN: CPT | Performed by: INTERNAL MEDICINE

## 2023-11-30 RX ORDER — BUDESONIDE 3 MG/1
2 CAPSULES CAPSULE, DELAYED RELEASE PELLETS ORAL ONCE A DAY
Qty: 60 CAPSULE | Refills: 1

## 2023-11-30 RX ORDER — BUDESONIDE 3 MG/1
2 CAPSULES CAPSULE, DELAYED RELEASE PELLETS ORAL ONCE A DAY
Qty: 60 CAPSULE | Refills: 1 | Status: ACTIVE | COMMUNITY

## 2023-11-30 RX ORDER — DICYCLOMINE HYDROCHLORIDE 10 MG/1
1 CAPSULES CAPSULE ORAL
Qty: 90 CAPSULE | Refills: 3 | Status: ACTIVE | COMMUNITY
Start: 2023-10-19 | End: 2024-02-15

## 2023-11-30 RX ORDER — DICYCLOMINE HYDROCHLORIDE 10 MG/1
1 CAPSULES CAPSULE ORAL
Qty: 90 CAPSULE | Refills: 3
Start: 2023-10-19 | End: 2024-03-29

## 2023-11-30 RX ORDER — AMITRIPTYLINE HYDROCHLORIDE 25 MG/1
1 TABLET AT BEDTIME TABLET, FILM COATED ORAL ONCE A DAY
Qty: 30 TABLET | Refills: 3 | OUTPATIENT

## 2023-11-30 RX ORDER — OMEPRAZOLE 40 MG/1
1 CAPSULE 30 MINUTES BEFORE MORNING MEAL CAPSULE, DELAYED RELEASE ORAL ONCE A DAY
Qty: 30 CAPSULE | Refills: 5 | Status: ACTIVE | COMMUNITY

## 2023-11-30 RX ORDER — OMEPRAZOLE 20 MG/1
1 CAPSULE 30 MINUTES BEFORE MORNING MEAL CAPSULE, DELAYED RELEASE ORAL ONCE A DAY
Qty: 90 CAPSULE | Refills: 3 | Status: ON HOLD | COMMUNITY
Start: 2023-06-14

## 2023-11-30 RX ORDER — AMITRIPTYLINE HYDROCHLORIDE 25 MG/1
1 TABLET AT BEDTIME TABLET, FILM COATED ORAL ONCE A DAY
Qty: 30 TABLET | Refills: 3 | Status: ACTIVE | COMMUNITY

## 2023-11-30 RX ORDER — OMEPRAZOLE 40 MG/1
1 CAPSULE 30 MINUTES BEFORE MORNING MEAL CAPSULE, DELAYED RELEASE ORAL ONCE A DAY
Qty: 30 CAPSULE | Refills: 5 | OUTPATIENT

## 2023-11-30 RX ORDER — SUCRALFATE 1 G
1 TABLET PRIOR TO MEALS TABLET ORAL TWICE A DAY
Qty: 60 TABLET | Refills: 1 | Status: ACTIVE | COMMUNITY

## 2023-11-30 NOTE — HPI-TODAY'S VISIT:
Patient was seen today for follow-up.  She went to the ER about 2 weeks ago with a complaints of having severe abdominal pain and blood in the stools.  Patient stated she woke up in the middle of the night with the diarrhea abdominal pain and cramping and the pain did not improve and hence she went to the ER.  CT scan of the abdomen pelvis showed some decompressed descending colon versus colitis and she was treated with Cipro and Flagyl.  Currently she is on 1 tablet of budesonide for collagenous colitis.  Patient stated she continues to have loose stools in the past week since she has been on antibiotics.  Denies any mucus in the stools.  Denies any unintentional weight loss.  She reports having increased stress which increases the symptoms of diarrhea.  11/30/23 : Patient was seen today for follow-up regarding her colitis as well as that is a gastritis.  Patient stated for the past even 2 weeks she has discontinued budesonide and has occasional diarrhea.  She reports amitriptyline 25 mg which she takes half a tablet during weekdays and full tablet at nighttime and during weekends helping her anxiety do as well as abdominal aches and pains.  Patient reports having triggers for diarrhea include stress and also certain foods.  Denies any nausea vomiting she was diagnosed with a sleep apnea currently started on CPAP she is planning on traveling to the [facility] via cruise.  She denies any sick contacts.

## 2024-01-08 ENCOUNTER — OFFICE VISIT (OUTPATIENT)
Dept: URBAN - METROPOLITAN AREA CLINIC 82 | Facility: CLINIC | Age: 54
End: 2024-01-08

## 2024-10-04 ENCOUNTER — TELEPHONE ENCOUNTER (OUTPATIENT)
Dept: URBAN - METROPOLITAN AREA CLINIC 82 | Facility: CLINIC | Age: 54
End: 2024-10-04

## 2024-10-04 ENCOUNTER — OFFICE VISIT (OUTPATIENT)
Dept: URBAN - METROPOLITAN AREA CLINIC 115 | Facility: CLINIC | Age: 54
End: 2024-10-04
Payer: COMMERCIAL

## 2024-10-04 VITALS
HEART RATE: 65 BPM | BODY MASS INDEX: 31.18 KG/M2 | SYSTOLIC BLOOD PRESSURE: 122 MMHG | DIASTOLIC BLOOD PRESSURE: 79 MMHG | HEIGHT: 66 IN | WEIGHT: 194 LBS | TEMPERATURE: 97.7 F

## 2024-10-04 DIAGNOSIS — K58.0 IRRITABLE BOWEL SYNDROME WITH DIARRHEA: ICD-10-CM

## 2024-10-04 DIAGNOSIS — K52.832 LYMPHOCYTIC COLITIS: ICD-10-CM

## 2024-10-04 DIAGNOSIS — R19.5 DARK STOOLS: ICD-10-CM

## 2024-10-04 DIAGNOSIS — R10.13 EPIGASTRIC PAIN: ICD-10-CM

## 2024-10-04 DIAGNOSIS — K29.90 GASTRITIS AND DUODENITIS: ICD-10-CM

## 2024-10-04 PROCEDURE — 99214 OFFICE O/P EST MOD 30 MIN: CPT | Performed by: INTERNAL MEDICINE

## 2024-10-04 RX ORDER — FLUOXETINE 40 MG/1
1 CAPSULE CAPSULE ORAL ONCE A DAY
Status: ACTIVE | COMMUNITY

## 2024-10-04 RX ORDER — AMITRIPTYLINE HYDROCHLORIDE 25 MG/1
1 TABLET AT BEDTIME TABLET, FILM COATED ORAL ONCE A DAY
Qty: 30 TABLET | Refills: 3 | Status: ON HOLD | COMMUNITY

## 2024-10-04 RX ORDER — SUCRALFATE 1 G/1
1 TABLET ON AN EMPTY STOMACH TABLET ORAL TWICE A DAY
Qty: 60 TABLET | Refills: 3
Start: 2024-10-04 | End: 2025-02-01

## 2024-10-04 RX ORDER — SUCRALFATE 1 G/1
1 TABLET PRIOR TO MEALS TABLET ORAL TWICE A DAY
Qty: 60 TABLET | Refills: 1 | Status: ACTIVE | COMMUNITY

## 2024-10-04 RX ORDER — BUDESONIDE 3 MG/1
2 CAPSULES CAPSULE, DELAYED RELEASE PELLETS ORAL ONCE A DAY
Qty: 60 CAPSULE | Refills: 1 | Status: ON HOLD | COMMUNITY

## 2024-10-04 RX ORDER — OMEPRAZOLE 40 MG/1
1 CAPSULE 30 MINUTES BEFORE MORNING MEAL CAPSULE, DELAYED RELEASE ORAL ONCE A DAY
Qty: 90 CAPSULE | Refills: 1
Start: 2024-10-04

## 2024-10-04 RX ORDER — OMEPRAZOLE 40 MG/1
1 CAPSULE 30 MINUTES BEFORE MORNING MEAL CAPSULE, DELAYED RELEASE ORAL ONCE A DAY
Qty: 90 CAPSULE | Refills: 1 | OUTPATIENT
Start: 2024-10-04

## 2024-10-04 RX ORDER — FEZOLINETANT 45 MG/1
1 TABLET TABLET, FILM COATED ORAL ONCE A DAY
Status: ACTIVE | COMMUNITY

## 2024-10-04 RX ORDER — OMEPRAZOLE 40 MG/1
1 CAPSULE 30 MINUTES BEFORE MORNING MEAL CAPSULE, DELAYED RELEASE ORAL ONCE A DAY
Qty: 30 CAPSULE | Refills: 5 | Status: ACTIVE | COMMUNITY

## 2024-10-04 RX ORDER — PRASUGREL 10 MG/1
AS DIRECTED TABLET, FILM COATED ORAL
Status: ACTIVE | COMMUNITY

## 2024-10-04 RX ORDER — OMEPRAZOLE 20 MG/1
1 CAPSULE 30 MINUTES BEFORE MORNING MEAL CAPSULE, DELAYED RELEASE ORAL ONCE A DAY
Qty: 90 CAPSULE | Refills: 3 | Status: ON HOLD | COMMUNITY
Start: 2023-06-14

## 2024-10-04 RX ORDER — SUCRALFATE 1 G/1
1 TABLET ON AN EMPTY STOMACH TABLET ORAL TWICE A DAY
Qty: 60 TABLET | Refills: 3 | OUTPATIENT
Start: 2024-10-04 | End: 2025-01-31

## 2024-10-04 RX ORDER — LOSARTAN POTASSIUM 50 MG/1
1 TABLET TABLET, FILM COATED ORAL ONCE A DAY
Status: ACTIVE | COMMUNITY

## 2024-10-04 RX ORDER — ATORVASTATIN CALCIUM 40 MG/1
1 TABLET TABLET, FILM COATED ORAL ONCE A DAY
Status: ACTIVE | COMMUNITY

## 2024-10-04 RX ORDER — SEMAGLUTIDE 0.5 MG/.5ML
0.5 ML INJECTION, SOLUTION SUBCUTANEOUS
Status: ACTIVE | COMMUNITY

## 2024-10-04 RX ORDER — ZINC GLUCONATE 50 MG
1 TABLET TABLET ORAL ONCE A DAY
Status: ACTIVE | COMMUNITY

## 2024-10-04 NOTE — PHYSICAL EXAM GASTROINTESTINAL
Abdomen , soft,epigastric tender, nondistended , no guarding or rigidity , no masses palpable , normal bowel sounds , Liver and Spleen,  no hepatosplenomegaly , liver nontender

## 2024-10-04 NOTE — HPI-TODAY'S VISIT:
Patient was seen today for follow-up.  She went to the ER about 2 weeks ago with a complaints of having severe abdominal pain and blood in the stools.  Patient stated she woke up in the middle of the night with the diarrhea abdominal pain and cramping and the pain did not improve and hence she went to the ER.  CT scan of the abdomen pelvis showed some decompressed descending colon versus colitis and she was treated with Cipro and Flagyl.  Currently she is on 1 tablet of budesonide for collagenous colitis.  Patient stated she continues to have loose stools in the past week since she has been on antibiotics.  Denies any mucus in the stools.  Denies any unintentional weight loss.  She reports having increased stress which increases the symptoms of diarrhea.  11/30/23 : Patient was seen today for follow-up regarding her colitis as well as that is a gastritis.  Patient stated for the past even 2 weeks she has discontinued budesonide and has occasional diarrhea.  She reports amitriptyline 25 mg which she takes half a tablet during weekdays and full tablet at nighttime and during weekends helping her anxiety do as well as abdominal aches and pains.  Patient reports having triggers for diarrhea include stress and also certain foods.  Denies any nausea vomiting she was diagnosed with a sleep apnea currently started on CPAP she is planning on traveling to the [facility] via cruise.  She denies any sick contacts.  10/4/24 : Ms. Watts is a 54-year-old female known to me from her recent history of lymphocytic colitis came in today for complaints of having mild anemia as well as darker stools.  Patient's base line hemoglobin is around 11.4 through from our lab records from our office from few months ago recently she had an episode of chest pain chest tightness went to the ER workup was consistent with 90% blockage he does not recall with which coronary underwent PTCA with stent placement currently she is on Effient and aspirin.  She reports having epigastric fullness burning sensation started having some darker stools however not black melanotic stools she reports more dark brown stools.  Patient reports occasional constipation.  She has she was treated for the lymphocytic colitis currently she has been off of of steroids budesonide for several months.  She reports epigastric fullness discomfort.  Previous chart was stress strenuous but not this time.  Patient stated she is trying to manage her symptoms with the diet changes.

## 2024-11-26 NOTE — HPI-TODAY'S VISIT:
Vanesa Blevins ([phone]) and RN, [place] ([phone]), Ms. Watts was seen today for complaints of having episodes of abdominal pain bloating cramping as well as diarrhea that is been going on for 4-6 times per day.  Patient reports having multiple bathroom trips at work.  She had been on Mounjaro for weight loss and took it for 2 months.  Patient stated she could not tolerated because it caused her severe constipation.  Even though she lost weight she could not continue with the maintenance because of the severe GI intolerance.  Patient reports losing about 30 pounds.  Complains of having abdominal bloating distention epigastric discomfort and pain.  In the past she was prescribed Xifaxan it did not help her with her complaints of having bloating and diarrhea.  She reports having recent blood testing for the labs which were told to be unremarkable.  Also complains of having overreactive bladder urinary bladder with urinary symptoms.  Recent upper endoscopy findings were reviewed that showed Schatzki's ring esophagitis and a 3 cm hiatal hernia.  Patient reports occasional dysphagia especially when she eats faster denies any vomiting.  8/14/23: Ms. Wong was seen today for follow-up for lymphocytic colitis.  Patient was recently diagnosed with lymphocytic colitis as well as gastritis based upon the random colon biopsies.  Patient denies any recent any NSAIDs on a frequent basis.  Patient had been on Mounjaro on Ozempic prior to starting the symptoms.  Also was noted to have acute gastritis.  Patient stated she is currently not taking any NSAIDs and budesonide has changed her bowel movements to 2 to 3/day but still has some abdominal cramping and discomfort.  Patient stated pain and cramping intermittent diarrhea gets worse when she is stressed and mostly at work.  Denies any fresh blood per rectum or melanotic stools.  Her son has some inflammatory bowel disease.  

## 2025-01-17 ENCOUNTER — OFFICE VISIT (OUTPATIENT)
Dept: URBAN - METROPOLITAN AREA CLINIC 115 | Facility: CLINIC | Age: 55
End: 2025-01-17